# Patient Record
Sex: MALE | HISPANIC OR LATINO | Employment: UNEMPLOYED | URBAN - METROPOLITAN AREA
[De-identification: names, ages, dates, MRNs, and addresses within clinical notes are randomized per-mention and may not be internally consistent; named-entity substitution may affect disease eponyms.]

---

## 2022-04-10 ENCOUNTER — HOSPITAL ENCOUNTER (EMERGENCY)
Facility: CLINIC | Age: 67
Discharge: HOME | End: 2022-04-11
Attending: EMERGENCY MEDICINE | Admitting: EMERGENCY MEDICINE
Payer: COMMERCIAL

## 2022-04-10 ENCOUNTER — APPOINTMENT (OUTPATIENT)
Dept: CT IMAGING | Facility: CLINIC | Age: 67
End: 2022-04-10
Attending: EMERGENCY MEDICINE
Payer: COMMERCIAL

## 2022-04-10 DIAGNOSIS — F10.921 ALCOHOL INTOXICATION, WITH DELIRIUM (H): ICD-10-CM

## 2022-04-10 DIAGNOSIS — E87.6 HYPOKALEMIA: ICD-10-CM

## 2022-04-10 LAB
ALBUMIN SERPL-MCNC: 3.4 G/DL (ref 3.4–5)
ALP SERPL-CCNC: 130 U/L (ref 40–150)
ALT SERPL W P-5'-P-CCNC: 27 U/L (ref 0–70)
ANION GAP SERPL CALCULATED.3IONS-SCNC: 6 MMOL/L (ref 3–14)
AST SERPL W P-5'-P-CCNC: 18 U/L (ref 0–45)
BASOPHILS # BLD AUTO: 0.1 10E3/UL (ref 0–0.2)
BASOPHILS NFR BLD AUTO: 1 %
BILIRUB SERPL-MCNC: <0.1 MG/DL (ref 0.2–1.3)
BUN SERPL-MCNC: 8 MG/DL (ref 7–30)
CALCIUM SERPL-MCNC: 8.4 MG/DL (ref 8.5–10.1)
CHLORIDE BLD-SCNC: 112 MMOL/L (ref 94–109)
CO2 SERPL-SCNC: 27 MMOL/L (ref 20–32)
CREAT SERPL-MCNC: 0.85 MG/DL (ref 0.66–1.25)
EOSINOPHIL # BLD AUTO: 0.1 10E3/UL (ref 0–0.7)
EOSINOPHIL NFR BLD AUTO: 1 %
ERYTHROCYTE [DISTWIDTH] IN BLOOD BY AUTOMATED COUNT: 13 % (ref 10–15)
ETHANOL SERPL-MCNC: 0.4 G/DL
GFR SERPL CREATININE-BSD FRML MDRD: >90 ML/MIN/1.73M2
GLUCOSE BLD-MCNC: 115 MG/DL (ref 70–99)
HCT VFR BLD AUTO: 48.9 % (ref 40–53)
HGB BLD-MCNC: 17 G/DL (ref 13.3–17.7)
IMM GRANULOCYTES # BLD: 0 10E3/UL
IMM GRANULOCYTES NFR BLD: 0 %
LYMPHOCYTES # BLD AUTO: 2.8 10E3/UL (ref 0.8–5.3)
LYMPHOCYTES NFR BLD AUTO: 30 %
MCH RBC QN AUTO: 33.3 PG (ref 26.5–33)
MCHC RBC AUTO-ENTMCNC: 34.8 G/DL (ref 31.5–36.5)
MCV RBC AUTO: 96 FL (ref 78–100)
MONOCYTES # BLD AUTO: 0.6 10E3/UL (ref 0–1.3)
MONOCYTES NFR BLD AUTO: 6 %
NEUTROPHILS # BLD AUTO: 5.9 10E3/UL (ref 1.6–8.3)
NEUTROPHILS NFR BLD AUTO: 62 %
NRBC # BLD AUTO: 0 10E3/UL
NRBC BLD AUTO-RTO: 0 /100
PLATELET # BLD AUTO: 218 10E3/UL (ref 150–450)
POTASSIUM BLD-SCNC: 3.3 MMOL/L (ref 3.4–5.3)
PROT SERPL-MCNC: 7.2 G/DL (ref 6.8–8.8)
RBC # BLD AUTO: 5.11 10E6/UL (ref 4.4–5.9)
SODIUM SERPL-SCNC: 145 MMOL/L (ref 133–144)
WBC # BLD AUTO: 9.5 10E3/UL (ref 4–11)

## 2022-04-10 PROCEDURE — 250N000011 HC RX IP 250 OP 636: Performed by: EMERGENCY MEDICINE

## 2022-04-10 PROCEDURE — 93005 ELECTROCARDIOGRAM TRACING: CPT

## 2022-04-10 PROCEDURE — 258N000003 HC RX IP 258 OP 636: Performed by: EMERGENCY MEDICINE

## 2022-04-10 PROCEDURE — 96366 THER/PROPH/DIAG IV INF ADDON: CPT

## 2022-04-10 PROCEDURE — 99285 EMERGENCY DEPT VISIT HI MDM: CPT | Mod: 25

## 2022-04-10 PROCEDURE — 70450 CT HEAD/BRAIN W/O DYE: CPT

## 2022-04-10 PROCEDURE — 82077 ASSAY SPEC XCP UR&BREATH IA: CPT | Performed by: EMERGENCY MEDICINE

## 2022-04-10 PROCEDURE — 80053 COMPREHEN METABOLIC PANEL: CPT | Performed by: EMERGENCY MEDICINE

## 2022-04-10 PROCEDURE — 250N000009 HC RX 250: Performed by: EMERGENCY MEDICINE

## 2022-04-10 PROCEDURE — 96365 THER/PROPH/DIAG IV INF INIT: CPT

## 2022-04-10 PROCEDURE — 36415 COLL VENOUS BLD VENIPUNCTURE: CPT | Performed by: EMERGENCY MEDICINE

## 2022-04-10 PROCEDURE — 85025 COMPLETE CBC W/AUTO DIFF WBC: CPT | Performed by: EMERGENCY MEDICINE

## 2022-04-10 RX ADMIN — MAGNESIUM SULFATE HEPTAHYDRATE 1000 ML/HR: 500 INJECTION, SOLUTION INTRAMUSCULAR; INTRAVENOUS at 23:42

## 2022-04-11 VITALS
TEMPERATURE: 98.9 F | HEART RATE: 77 BPM | DIASTOLIC BLOOD PRESSURE: 71 MMHG | SYSTOLIC BLOOD PRESSURE: 128 MMHG | OXYGEN SATURATION: 94 % | RESPIRATION RATE: 18 BRPM

## 2022-04-11 NOTE — ED PROVIDER NOTES
"  History   Chief Complaint:  Alcohol Intoxication       The history is provided by the EMS personnel. The history is limited by the condition of the patient.      Raghu Olmstead is a 66 year old male who presents via EMS with alcohol intoxication. Nursing staff report that the patient was found unresponsive by family laying on the ground and was unable get up under his own power. His wife told EMS that he is a \"closeted drinker\" and that she is unsure of how much alcohol was consumed this evening.     Review of Systems   Unable to perform ROS: Patient unresponsive     Allergies:  No known drug allergies     Medications:  No known current medications     Past Medical History:     No known past medical history     Past Surgical History:    No known past surgical history     Family History:    No known family history     Social History:  Arrives via EMS  Unaccompanied in the ED     Physical Exam     Patient Vitals for the past 24 hrs:   BP Temp Temp src Pulse Resp SpO2   04/10/22 2330 -- -- -- -- -- 96 %   04/10/22 2235 -- 98.9  F (37.2  C) Oral 85 12 98 %   04/10/22 2230 -- -- -- -- -- 99 %   04/10/22 2214 115/77 -- -- -- -- --       Physical Exam    Nursing note and vitals reviewed.  Constitutional:  Resting on gurney with eyes open.   HENT:   Head:    Atraumatic.   Mouth/Throat:   Oropharynx is clear and moist. No oropharyngeal exudate.   Eyes:    Pupils are equal, round, and reactive to light.   Neck:    Normal range of motion. Neck supple.      No tracheal deviation present. No thyromegaly present.   Cardiovascular:  Normal rate, regular rhythm, no murmur   Pulmonary/Chest: Breath sounds are clear and equal without wheezes or crackles.  Abdominal:   Soft. Bowel sounds are normal. Exhibits no distension and      no mass. There is no tenderness.      There is no rebound and no guarding.   Musculoskeletal:  Exhibits no edema.   Lymphadenopathy:  No cervical adenopathy.   Neurological:   Appears delirious. " Moves arms and legs with good strength. Does not answer questions.   Skin:    Skin is warm and dry. No rash noted. No pallor.       Emergency Department Course     ECG:  Taken: 2212, Read: 2226  Sinus rhythm with premature atrial complexes  Otherwise normal ECG  Rate 99 bpm. IL interval 202. QRS duration 72. QT/QTc 358/459. P-R-T axes 65 31 32.      Emergency Department Course:    Imaging:    Head CT w/o contrast   Final Result   IMPRESSION:   1.  Mild subgaleal edema over the posterior midline calvarium without calvarial fracture.   2.  No acute intracranial pathology.        Lab results:    Labs Ordered and Resulted from Time of ED Arrival to Time of ED Departure   COMPREHENSIVE METABOLIC PANEL - Abnormal       Result Value    Sodium 145 (*)     Potassium 3.3 (*)     Chloride 112 (*)     Carbon Dioxide (CO2) 27      Anion Gap 6      Urea Nitrogen 8      Creatinine 0.85      Calcium 8.4 (*)     Glucose 115 (*)     Alkaline Phosphatase 130      AST 18      ALT 27      Protein Total 7.2      Albumin 3.4      Bilirubin Total <0.1 (*)     GFR Estimate >90     ETHYL ALCOHOL LEVEL - Abnormal    Alcohol ethyl 0.40 (*)    CBC WITH PLATELETS AND DIFFERENTIAL - Abnormal    WBC Count 9.5      RBC Count 5.11      Hemoglobin 17.0      Hematocrit 48.9      MCV 96      MCH 33.3 (*)     MCHC 34.8      RDW 13.0      Platelet Count 218      % Neutrophils 62      % Lymphocytes 30      % Monocytes 6      % Eosinophils 1      % Basophils 1      % Immature Granulocytes 0      NRBCs per 100 WBC 0      Absolute Neutrophils 5.9      Absolute Lymphocytes 2.8      Absolute Monocytes 0.6      Absolute Eosinophils 0.1      Absolute Basophils 0.1      Absolute Immature Granulocytes 0.0      Absolute NRBCs 0.0       Reviewed:  I reviewed nursing notes, vitals and past medical history    Assessments:  2141 I obtained history and examined the patient as noted above.     Interventions:  Medications   sodium chloride 0.9 % 1,000 mL with Infuvite  Adult 10 mL, thiamine 100 mg, folic acid 1 mg, potassium chloride 20 mEq, magnesium sulfate 2 g infusion (has no administration in time range)       Disposition:  Care of the patient was transferred to my colleague Dr. Dykes pending head CT imaging results.     Impression & Plan     Medical Decision Making:  Raghu Olmstead is a 66 year old male. I found this patient to have acute alcohol intoxication with delirium with a very high blood alcohol level of 0.40. there was no visible sign of trauma, however due to his significant altered mental status, head CT was performed and shows a scalp contusion but no intracranial bleed or skull fracture.. He was given a banana bag  IV and signed out to Dr. Esposito for further evaluation and treatment to monitor for his altered mental status until he becomes clinically sober and to monitor for possible alcohol withdrawal.     Diagnosis:    ICD-10-CM    1. Alcohol intoxication, with delirium (H)  F10.921    2. Hypokalemia  E87.6        Scribe Disclosure:  I, Joni Busby, am serving as a scribe at 9:53 PM on 4/10/2022 to document services personally performed by Fouzia Villasenor MD based on my observations and the provider's statements to me.            Fouzia Villasenor MD  04/10/22 2332       Fouzia Villasenor MD  04/10/22 7809       Fouzia Villasenor MD  04/11/22 0015

## 2022-04-11 NOTE — ED NOTES
"Pt has pulled out his iv a second time and is standing unsteadily at edge of bed. Pt give a urinal which he used and then dropped onto the floor. Pt placed back into bed and gown placed on pt. Pt has repeatedly pulled off his blankets and exposed himself. Pt informed that he needs to keep his clothing on as he is on cameras. Pt again states that he wants to go home. Pt informed that his wife and son had called 911 because they were unable to take care of him in his intoxicated state. Pt will state \"sorry\" and lay back down. He remains restless in the bed.  "

## 2022-04-11 NOTE — ED NOTES
Assisted pt with urinal. Reviewed with pt where he was and why he was in the hospital. Informed that he will need to stay here until he chasity up. Pt expresses understanding but d/t intoxicated state he will need to be reminded.

## 2022-04-11 NOTE — ED NOTES
DATE:  4/10/2022   TIME OF RECEIPT FROM LAB:  2231  LAB TEST:  ethanol  LAB VALUE:  0.40  RESULTS GIVEN WITH READ-BACK TO (PROVIDER):  Fouzia Villasenor MD  TIME LAB VALUE REPORTED TO PROVIDER:   0773

## 2022-04-11 NOTE — ED NOTES
Bed: [facility]  Expected date:   Expected time:   Means of arrival:   Comments:  441 66m etoh

## 2022-04-11 NOTE — ED PROVIDER NOTES
Sign Out Note    Pt accepted in sign out from: Dr. Esposito    Briefly pt presented to the ED for: etoh    Plan at time of sign out: await sobriety and reassess.    Care of patient during my shift: no issues. Patient now clinically sober. RN able to watch patient walk without concern. I had a face to face with the patient at 900 and he had no medical concerns and felt comfortable going home.     Plan for patient at this time: discharged.     Mert Martinez,   04/11/22 0905

## 2022-04-11 NOTE — ED NOTES
Pt resting quietly on cart. Eyes closed.  Respirations regular.  Pt repositions self without assistance.

## 2022-04-11 NOTE — ED NOTES
Assisted pt with urinal. Pt restless. Needs frequent reminders that he is in the hospital d/t being heavily intoxicated.

## 2022-04-11 NOTE — ED NOTES
Pt given water and updated on his whereabouts.  Does not remember being brought to ER or why he is here.

## 2022-04-11 NOTE — ED TRIAGE NOTES
"Pt comes from home where pt is an apparent \"closet drinker\", wife is unaware of how much the patient drinks. Pt did void self in ambulance on way to hospital. Pt was found on the ground and unable to get back up.   "

## 2022-04-11 NOTE — ED NOTES
Pt again pulled off his gown and has crawled out of bed saying he needs to urinate. Pt naked and unsteady on his feet. Pt put back into bed, given the urinal and gown placed back on pt. Again reviewed with pt why he is in the ED and criteria for discharge.

## 2022-04-11 NOTE — ED NOTES
Pt pulled out his iv while removing his shirt. New iv placed. Reviewed with pt the need to sober up before he discharges. Pt using the urinal.

## 2022-04-11 NOTE — ED PROVIDER NOTES
Here intoxicated, CT head negative.     Patient continues to require time for clinical sobriety.  He is resting comfortably upon my recheck.  Patient signed out to Dr. Martinez, anticipate discharge home once ambulatory with a steady gait and able to tolerate p.o.      Eliu Esposito MD  [employer]  12:01 AM 04/11/22        Eliu Esposito MD  04/11/22 0609

## 2022-04-12 LAB
ATRIAL RATE - MUSE: 99 BPM
DIASTOLIC BLOOD PRESSURE - MUSE: NORMAL MMHG
INTERPRETATION ECG - MUSE: NORMAL
P AXIS - MUSE: 65 DEGREES
PR INTERVAL - MUSE: 202 MS
QRS DURATION - MUSE: 72 MS
QT - MUSE: 358 MS
QTC - MUSE: 459 MS
R AXIS - MUSE: 31 DEGREES
SYSTOLIC BLOOD PRESSURE - MUSE: NORMAL MMHG
T AXIS - MUSE: 32 DEGREES
VENTRICULAR RATE- MUSE: 99 BPM